# Patient Record
Sex: MALE | Race: WHITE | NOT HISPANIC OR LATINO | URBAN - METROPOLITAN AREA
[De-identification: names, ages, dates, MRNs, and addresses within clinical notes are randomized per-mention and may not be internally consistent; named-entity substitution may affect disease eponyms.]

---

## 2017-10-24 ENCOUNTER — TRANSFERRED RECORDS (OUTPATIENT)
Dept: HEALTH INFORMATION MANAGEMENT | Facility: CLINIC | Age: 47
End: 2017-10-24

## 2017-11-16 ENCOUNTER — OFFICE VISIT (OUTPATIENT)
Dept: OPHTHALMOLOGY | Facility: CLINIC | Age: 47
End: 2017-11-16
Attending: OPHTHALMOLOGY
Payer: COMMERCIAL

## 2017-11-16 ENCOUNTER — TELEPHONE (OUTPATIENT)
Dept: OPHTHALMOLOGY | Facility: CLINIC | Age: 47
End: 2017-11-16

## 2017-11-16 DIAGNOSIS — H35.52 RP (RETINITIS PIGMENTOSA): Primary | ICD-10-CM

## 2017-11-16 PROCEDURE — 92015 DETERMINE REFRACTIVE STATE: CPT | Mod: ZF

## 2017-11-16 PROCEDURE — 92250 FUNDUS PHOTOGRAPHY W/I&R: CPT | Mod: ZF | Performed by: OPHTHALMOLOGY

## 2017-11-16 PROCEDURE — 99214 OFFICE O/P EST MOD 30 MIN: CPT | Mod: ZF

## 2017-11-16 PROCEDURE — 92134 CPTRZ OPH DX IMG PST SGM RTA: CPT | Mod: ZF | Performed by: OPHTHALMOLOGY

## 2017-11-16 RX ORDER — ALLOPURINOL 100 MG/1
100 TABLET ORAL DAILY
COMMUNITY
Start: 2017-06-30 | End: 2019-02-18

## 2017-11-16 ASSESSMENT — CONF VISUAL FIELD
OD_SUPERIOR_NASAL_RESTRICTION: 3
OS_SUPERIOR_TEMPORAL_RESTRICTION: 3
OD_INFERIOR_NASAL_RESTRICTION: 1
OS_SUPERIOR_NASAL_RESTRICTION: 3
OS_INFERIOR_NASAL_RESTRICTION: 1
OD_SUPERIOR_TEMPORAL_RESTRICTION: 3
OD_INFERIOR_TEMPORAL_RESTRICTION: 3
OS_INFERIOR_TEMPORAL_RESTRICTION: 3

## 2017-11-16 ASSESSMENT — REFRACTION_WEARINGRX
SPECS_TYPE: LAST MR
SPECS_TYPE: OTC

## 2017-11-16 ASSESSMENT — EXTERNAL EXAM - RIGHT EYE: OD_EXAM: NORMAL

## 2017-11-16 ASSESSMENT — REFRACTION_MANIFEST
OS_AXIS: 150
OD_SPHERE: -1.25
OS_SPHERE: +0.25
OS_AXIS: 150
OS_SPHERE: +1.75
OS_CYLINDER: +0.25
OD_AXIS: 010
OD_CYLINDER: +1.00
OD_CYLINDER: +1.00
OS_CYLINDER: +0.25
OD_SPHERE: +1.00
OD_AXIS: 010

## 2017-11-16 ASSESSMENT — VISUAL ACUITY
METHOD: SNELLEN - LINEAR
OD_SC+: -1
OD_CC: 20/50
OD_SC: 20/50
OS_CC+: +2
OS_SC: 20/60
OS_SC+: +2
OS_CC: 20/50

## 2017-11-16 ASSESSMENT — TONOMETRY
IOP_METHOD: TONOPEN
OS_IOP_MMHG: 21
OD_IOP_MMHG: 21

## 2017-11-16 ASSESSMENT — CUP TO DISC RATIO
OS_RATIO: 0.15
OD_RATIO: 0.15

## 2017-11-16 ASSESSMENT — EXTERNAL EXAM - LEFT EYE: OS_EXAM: NORMAL

## 2017-11-16 ASSESSMENT — SLIT LAMP EXAM - LIDS
COMMENTS: NORMAL
COMMENTS: NORMAL

## 2017-11-16 NOTE — NURSING NOTE
Chief Complaints and History of Present Illnesses   Patient presents with     New Patient     HPI    Affected eye(s):  Both   Symptoms:     Blurred vision   Decreased vision   Photophobia         Do you have eye pain now?:  No      Comments:  New patient with worsening vision.  The patient has noticed a constriction of his VF this past year.  SILVINO James 8:55 AM 11/16/2017

## 2017-11-16 NOTE — MR AVS SNAPSHOT
After Visit Summary   2017    Alonso Villeda    MRN: [id number]           Patient Information     Date Of Birth          1970        Visit Information        Provider Department      2017 8:45 AM Mitzi Nichols MD [facility]        Today's Diagnoses     RP (retinitis pigmentosa) - Both Eyes    -  1       Follow-ups after your visit        Additional Services     ERG Referral       Era Stone MD to interpret            ERG Referral       Era Stone MD to interpret                  Follow-up notes from your care team     Return in about 7 weeks (around 2018) for review of ffERG .      Who to contact     Please call your clinic at [phone] to:    Ask questions about your health    Make or cancel appointments    Discuss your medicines    Learn about your test results    Speak to your doctor   If you have compliments or concerns about an experience at your clinic, or if you wish to file a complaint, please contact [facility] Patient Relations at [phone] or email us at [email or web address]         Additional Information About Your Visit        MyChart Information     Univa is an electronic gateway that provides easy, online access to your medical records. With Univa, you can request a clinic appointment, read your test results, renew a prescription or communicate with your care team.     To sign up for Univa visit the website at [email or web address]   You will be asked to enter the access code listed below, as well as some personal information. Please follow the directions to create your username and password.     Your access code is: [email or web address]  Expires: 2018  5:30 AM     Your access code will  in 90 days. If you need help or a new code, please contact your [facility] or call [phone] for assistance.        Care EveryWhere ID     This is your Care  EveryWhere ID. This could be used by other organizations to access your [place] medical records  [id number]         Blood Pressure from Last 3 Encounters:   No data found for BP    Weight from Last 3 Encounters:   No data found for Wt              We Performed the Following     ERG Referral     ERG Referral     Fundus Autofluorescence Image (FAF) OU (both eyes)     Fundus Photos OU (both eyes)     OCT Retina Spectralis OU (both eyes)        Primary Care Provider    Physician No Ref-Primary       NO REF-PRIMARY PHYSICIAN        Equal Access to Services     ISREAL JANSEN : Hadii aad ku hadasho Soomaali, waaxda luqadaha, qaybta kaalmada adeegyada, waxay anupin augustinn roseann nevillejenna shukla . So [facility] [phone].    ATENCIÓN: Si imtiaz avery, tiene a reaves disposición servicios gratuitos de asistencia lingüística. Llame al [phone].    We comply with applicable federal civil rights laws and [place] laws. We do not discriminate on the basis of race, color, national origin, age, disability, sex, sexual orientation, or gender identity.            Thank you!     Thank you for choosing EYE CLINIC  for your care. Our goal is always to provide you with excellent care. Hearing back from our patients is one way we can continue to improve our services. Please take a few minutes to complete the written survey that you may receive in the mail after your visit with us. Thank you!             Your Updated Medication List - Protect others around you: Learn how to safely use, store and throw away your medicines at www.disposemymeds.org.          This list is accurate as of: 11/16/17 11:32 AM.  Always use your most recent med list.                   Brand Name Dispense Instructions for use Diagnosis    allopurinol 100 MG tablet    ZYLOPRIM     Take 100 mg by mouth daily

## 2017-11-16 NOTE — PROGRESS NOTES
I have confirmed the patient's and reviewed Past Medical History, Past Surgical History, Social History, Family History, Problem List, Medication List and agree with Tech note.    CC: blurry vision both eyes     HPI: patient referred by Dr. Alejandre, he has Retinitis pigmentosa and visual acuity is worsening.  Interested in ffERG    Assessment/plan:   1.  Retinitis pigmentosa both eyes    - Optos photos and fundus autofluorescence today show severely constricted fields    - OCT both eyes for baseline at this office    - schedule ffERG OU      RTC one month after ffERG    Mitzi Hamlin MD PhD.  Professor & Chair

## 2017-11-20 DIAGNOSIS — H35.52 RP (RETINITIS PIGMENTOSA): Primary | ICD-10-CM

## 2018-01-23 ENCOUNTER — ALLIED HEALTH/NURSE VISIT (OUTPATIENT)
Dept: OPHTHALMOLOGY | Facility: CLINIC | Age: 48
End: 2018-01-23
Attending: OPHTHALMOLOGY
Payer: COMMERCIAL

## 2018-01-23 DIAGNOSIS — H35.52 RP (RETINITIS PIGMENTOSA): ICD-10-CM

## 2018-01-23 PROCEDURE — 92275 FFERG OU (BOTH EYES): CPT | Mod: ZF

## 2018-01-23 PROCEDURE — 40000269 ZZH STATISTIC NO CHARGE FACILITY FEE: Mod: ZF

## 2018-01-23 ASSESSMENT — REFRACTION_WEARINGRX
OS_AXIS: 150
OS_CYLINDER: +0.25
OS_ADD: +2.50
OD_ADD: +2.50
OD_SPHERE: -1.25
OD_CYLINDER: +1.00
OS_SPHERE: +0.25
OD_AXIS: 010

## 2018-01-23 ASSESSMENT — REFRACTION_MANIFEST
OD_ADD: +3.25
OS_ADD: +3.25

## 2018-01-23 NOTE — LETTER
2018    STANDARD ERG REPORT    Referring:  Angelo Hamlin MD      RE:  Alonso Villeda  MRN:  [id number]  :  1970    ERG Date:  2018    CLINICAL HISTORY: Alonso Villeda is a 47-year-old patient with diagnosis of RP (retinitis pigmentosa), referred by Dr. Hamlin for a full-field electroretinogram (ERG). The patient reports he needs more lighting to function visually.  He feels peripherally his vision is decreasing and has been surprised that objects are close to him.    IMPRESSION: Advanced hannah-cone dystrophy            Visual Acuity:     Right Eye:  20/50-            w/MRx 11-16-17 in T.F. & IOL    Visual Acuity:     Left Eye:    20/50            w/MRx 11-16-17 in T.F. & IOL                                                     ALL AVERAGED  Data for Full-Field ERG Right Eye   Left Eye    Dark-Adapted Patient Normal Patient   0.01 ERG (hannah) amplitude( v) 11 61.3-334.7 -----   0.01 ERG (hannah) implicit time(ms) 96.5 75.1-108.0 -----   MMMMM      3.0 ERG (combined) a-wave amplitude( v) -3 -195.0 to -96.5 -2   3.0 ERG (combined) a-wave implicit time(ms) 15.8 15.2-18.2 15   3.0 ERG (combined) b-wave amplitude( v) 8 115.0-446.3 6*   3.0 ERG (combined) b-wave implicit time(ms) 49.9 30.0-50.3 52.4*   MMMMM      3.0 Oscillatory Potentials  Severely reduced Present Severely reduced    Light-Adapted      3.0 Flicker (30-Hz) amplitude( v) 9 68.5-147.5 7   3.0 Flicker (30-Hz) implicit time(ms) 27.4 21.5-26.8 30         3.0 ERG (cone) a-wave amplitude( v) -5 -59.8 to -24.4 -2   3.0 ERG (cone) a-wave implicit time(ms) 10 14.9-18.0 15   3.0 ERG (cone) b-wave amplitude( v) 13 86.1-205.2 8   3.0 ERG (cone) b-wave implicit time(ms) 34.1 25.8-29.6 34.9             * = manipulated cursors    INTERPRETATION:  This full-field electroretinogram was performed according to ISCEV standards using the  ESPION E3 system and DTL fiber-recording electrodes. The patient tolerated the testing well. The waveforms are fairly  reproducible and well formed. The normative values provided above represent the 95% confidence limits for a normal individual the age of the patient. The patient s responses are averaged. There is mild asymmetry of the responses in between both eyes. The hannah-specific responses have problems with reproducibility and muscle artifact, but probably normal for both eyes.    In dark-adapted conditions, the hannah-specific responses have severely reduced amplitudes in the right eye and non-detectable amplitude in the left eye. The implicit time in the right eye is normal. The maximal response, a combined hannah and cone response, has severely reduced amplitudes in both eyes and the implicit time is normal in the right eye and is delayed left eye. With a higher intensity flash the responses improve, but are persistently reduced in both eyes. The bright flash (scotopic 10.0) response is not electronegative. The oscillatory potentials are severely reduced bilaterally.    In light-adapted conditions, the 30-Hz flicker responses have minimal residual amplitudes and the implicit time is delayed in both eyes.  The single photopic response has abnormal amplitudes and the implicit time is delayed for the cone b-wave responses in both eyes.    CONCLUSION: This represents an abnormal electroretinogram consistent with the diagnosis of RP (retinitis pigmentosa). This electroretinogram is abnormal, showing severe loss of hannah/cone function. There is only minimal residual amplitude. Consideration could be given to drawing blood for the retinal dystrophy panel, with hopes of determining the mutations accounting for this retinal dystrophy. Clinical correlation is recommended.    A repeat electroretinogram could be considered in 1-2 years, or earlier if the clinical situation changes.     Angelo, thank you for the opportunity to provide electrophysiologic services for this patient.  Please do not hesitate to call if there should be any questions  regarding these results.    Sincerely,    Era Stone MD     Retina Service   Department of Ophthalmology & Visual Neurosciences   [facility]  Phone: [phone]   Fax: [phone]         cc:  Jonel Alejandre MD/ IBRAHIMAS

## 2018-01-23 NOTE — MR AVS SNAPSHOT
After Visit Summary   2018    Alonso Villeda    MRN: [id number]           Patient Information     Date Of Birth          1970        Visit Information        Provider Department      2018 1:00 PM [facility] EYE ELECTRODIAGNOSTIC Eye Clinic         Follow-ups after your visit        Your next 10 appointments already scheduled     2018 10:00 AM CST   RETURN RETINA with Mitzi Nichols MD   Eye Clinic ([facility])    [address]  [address]  [address]  [place] [facility] [address] [phone]              Who to contact     Please call your clinic at [phone] to:    Ask questions about your health    Make or cancel appointments    Discuss your medicines    Learn about your test results    Speak to your doctor   If you have compliments or concerns about an experience at your clinic, or if you wish to file a complaint, please contact [facility] Patient Relations at [phone] or email us at [email or web address]         Additional Information About Your Visit        MyChart Information     ZenCard is an electronic gateway that provides easy, online access to your medical records. With ZenCard, you can request a clinic appointment, read your test results, renew a prescription or communicate with your care team.     To sign up for ZenCard visit the website at [email or web address]   You will be asked to enter the access code listed below, as well as some personal information. Please follow the directions to create your username and password.     Your access code is: [email or web address]  Expires: 4/15/2018  9:11 AM     Your access code will  in 90 days. If you need help or a new code, please contact your [facility] or call [phone] for assistance.        Care EveryWhere ID     This is your Care EveryWhere ID. This could be used by other organizations to access your  [place] medical records  [id number]         Blood Pressure from Last 3 Encounters:   No data found for BP    Weight from Last 3 Encounters:   No data found for Wt              Today, you had the following     No orders found for display       Primary Care Provider Fax #    Physician No Ref-Primary [phone]       No address on file        Equal Access to Services     ISREAL JANSEN : Hadii aad ku hadasho Soomaali, waaxda luqadaha, qaybta kaalmada adeegyada, waxay anupin hayrubenn adekacey dennis la'rubenyazan . So [facility] [phone].    ATENCIÓN: Si habla español, tiene a reaves disposición servicios gratuitos de asistencia lingüística. [facility] [phone].    We comply with applicable federal civil rights laws and [place] laws. We do not discriminate on the basis of race, color, national origin, age, disability, sex, sexual orientation, or gender identity.            Thank you!     Thank you for choosing EYE CLINIC  for your care. Our goal is always to provide you with excellent care. Hearing back from our patients is one way we can continue to improve our services. Please take a few minutes to complete the written survey that you may receive in the mail after your visit with us. Thank you!             Your Updated Medication List - Protect others around you: Learn how to safely use, store and throw away your medicines at www.disposemymeds.org.          This list is accurate as of: 1/23/18  3:51 PM.  Always use your most recent med list.                   Brand Name Dispense Instructions for use Diagnosis    allopurinol 100 MG tablet    ZYLOPRIM     Take 100 mg by mouth daily

## 2018-01-24 ASSESSMENT — VISUAL ACUITY
OD_SC: 20/50
OS_CC: 20/50
METHOD: SNELLEN - LINEAR
OD_CC+: -
OD_SC+: -
OD_CC: 20/50
OS_SC+: -+
OS_SC: 20/60

## 2018-01-24 NOTE — NURSING NOTE
Returns for ffERG per ERG Referral, EVK 11-16.  Referred by Dr. Jonel Alejandre/WOOD to K for presumed RP.  See Outside Outpt notes 10-24-17.  INDER wallace/EVK 11-16-17 and no interval changes to report.  Has not yet filled gls rx and has questions about the different rxs that were determined at last visit.  Not all rxs printed when he received the rx printout.  Needs more lighting to function visually.  He feels peripheral vision is decreasing and has been surprised that objects are close to him.  Will attempt MyChart again.  Scheduled for RTN to Retina (EVK) 02-07 and will await results.

## 2018-01-24 NOTE — PROGRESS NOTES
2018    STANDARD ERG REPORT    Referring:  MD Jonel Anne MD/ IBRAHIMAS  RE:  Alonso Villeda  MRN:  [id number]  :  1970    ERG Date:  2018    CLINICAL HISTORY: Alonso Villeda is a 47 year old year-old patient with diagnosis of RP (retinitis pigmentosa), referred by Dr. Nichols for a full field electroretinogram (ERG). The patient reports he needs more lighting to function visually.  He feels peripheral his vision is decreasing and has been surprised that objects are close to him    IMPRESSION: Advanced Murphy-cone dystrophy          Visual Acuity Right Eye : 20/50-      w/MRx 11-16-17 in T.F. & IOL    Visual Acuity Left Eye : 20/50      w/MRx 11-16-17 in T.F. & IOL                      ALL AVERAGED  Data for Full-Field ERG Right Eye   Left Eye    Dark-Adapted Patient Normal Patient   0.01 ERG (murphy) amplitude( v) 11 61.3-334.7 -----   0.01 ERG (murphy) implicit time(ms) 96.5 75.1-108.0 -----   MMMMM      3.0 ERG (combined) a-wave amplitude( v) -3 -195.0 to -96.5 -2   3.0 ERG (combined) a-wave implicit time(ms) 15.8 15.2-18.2 15   3.0 ERG (combined) b-wave amplitude( v) 8 115.0-446.3 6*   3.0 ERG (combined) b-wave implicit time(ms) 49.9 30.0-50.3 52.4*   MMMMM      3.0 Oscillatory Potentials   Present     Light-Adapted      3.0 Flicker (30-Hz) amplitude( v) 9 68.5-147.5 7   3.0 Flicker (30-Hz) implicit time(ms) 27.4 21.5-26.8 30         3.0 ERG (cone) a-wave amplitude( v) -5 -59.8 to -24.4 -2   3.0 ERG (cone) a-wave implicit time(ms) 10 14.9-18.0 15   3.0 ERG (cone) b-wave amplitude( v) 13 86.1-205.2 8   3.0 ERG (cone) b-wave implicit time(ms) 34.1 25.8-29.6 34.9         * = manipulated cursors    INTERPRETATION:        This full field electroretinogram was performed according to ISCEV standards using Advasense E3 system and DTL fiber recording electrodes. The patient tolerated the testing well.  The waveforms are fairly reproducible and well formed.  The normative values provided above represent  the 95% confidence limits for a normal individual the age of the patient. The patient s responses are averaged. There is mild asymmetry of the responses in between both eyes.  The hannah-specific responses have problems with reproducibility and muscle artifact, but probably normal for both eyes.  In dark adapted conditions, the hannah-specific responses have severe reduced amplitudes in right eye and non detectable amplitude left eye. The implicit time right eye  is normal. The maximal response, a combined hannah and cone responses, have severe reduced amplitudes in both eyes and the implicit time is normal right eye and delayed left eye.  With a higher intensity flash, the responses improve, but persistently reduced in both eyes. The bright flash (scotopic 10.0) response is not electronegative. The oscillatory potentials are severely reduced bilaterally.    In light adapted conditions, the 30-Hz flicker responses have minimal residual amplitude and the implicit time is delayed in both eyes.    The single photopic response has abnormal amplitudes and and the implicit time is delayed for the cone b-wave responses in both eyes.    Conclusion: This represents an abnormal electroretinogram consistent with the diagnosis of RP (retinitis pigmentosa).   This electroretinogram is abnormal, showing severe loss of hannah/cone function. There is only minimal residual amplitude.  Consideration could be given to drawing blood for the retinal dystrophy panel, with hopes of determining the mutations accounting for this retinal dystrophy.     Clinical correlation is recommended.    A  repeat electroretinogram could be considered in 1-2 years or earlier if the clinical situation changes.     Kayceer Angelo, thank you for the opportunity to provide electrophysiologic services for this patient.  Please do not hesitate to call if there should be any questions regarding these results.    Era Stone MD     Retina Service    Department of Ophthalmology & Visual Neurosciences   [facility]  Phone: [phone]   Fax: [phone]

## 2018-02-07 ENCOUNTER — OFFICE VISIT (OUTPATIENT)
Dept: OPHTHALMOLOGY | Facility: CLINIC | Age: 48
End: 2018-02-07
Attending: OPHTHALMOLOGY
Payer: COMMERCIAL

## 2018-02-07 DIAGNOSIS — H35.52 RP (RETINITIS PIGMENTOSA): Primary | ICD-10-CM

## 2018-02-07 PROCEDURE — G0463 HOSPITAL OUTPT CLINIC VISIT: HCPCS | Mod: ZF

## 2018-02-07 ASSESSMENT — REFRACTION_WEARINGRX: SPECS_TYPE: OTC

## 2018-02-07 ASSESSMENT — TONOMETRY
IOP_METHOD: ICARE
OD_IOP_MMHG: 15
OS_IOP_MMHG: 15

## 2018-02-07 ASSESSMENT — VISUAL ACUITY
OD_CC+: -1
METHOD: SNELLEN - LINEAR
OS_CC: 20/60
CORRECTION_TYPE: GLASSES
OS_CC+: -1
OD_CC: 20/60

## 2018-02-07 ASSESSMENT — CONF VISUAL FIELD
OD_INFERIOR_TEMPORAL_RESTRICTION: 3
OS_SUPERIOR_NASAL_RESTRICTION: 3
OD_SUPERIOR_TEMPORAL_RESTRICTION: 3
OS_SUPERIOR_TEMPORAL_RESTRICTION: 3
OD_SUPERIOR_NASAL_RESTRICTION: 3
OS_INFERIOR_NASAL_RESTRICTION: 1
OS_INFERIOR_TEMPORAL_RESTRICTION: 3
OD_INFERIOR_NASAL_RESTRICTION: 1

## 2018-02-07 NOTE — MR AVS SNAPSHOT
After Visit Summary   2018    Alonso Villeda    MRN: [id number]           Patient Information     Date Of Birth          1970        Visit Information        Provider Department      2018 10:00 AM Mitzi Nichols MD [facility]        Today's Diagnoses     RP (retinitis pigmentosa)    -  1       Follow-ups after your visit        Follow-up notes from your care team     Return in about 1 year (around 2019) for RP .      Who to contact     Please call your clinic at [phone] to:    Ask questions about your health    Make or cancel appointments    Discuss your medicines    Learn about your test results    Speak to your doctor   If you have compliments or concerns about an experience at your clinic, or if you wish to file a complaint, please contact [facility] Patient Relations at [phone] or email us at [email or web address]         Additional Information About Your Visit        MyChart Information     Enterra Solutionst is an electronic gateway that provides easy, online access to your medical records. With UniYu, you can request a clinic appointment, read your test results, renew a prescription or communicate with your care team.     To sign up for Enterra Solutionst visit the website at [email or web address]   You will be asked to enter the access code listed below, as well as some personal information. Please follow the directions to create your username and password.     Your access code is: [email or web address]  Expires: 4/15/2018  9:11 AM     Your access code will  in 90 days. If you need help or a new code, please contact your [facility] or call [phone] for assistance.        Care EveryWhere ID     This is your Care EveryWhere ID. This could be used by other organizations to access your [place] medical records  [id number]         Blood Pressure from Last 3 Encounters:   No data found for BP    Weight  from Last 3 Encounters:   No data found for Wt              Today, you had the following     No orders found for display       Primary Care Provider Fax #    Physician No Ref-Primary [phone]       No address on file        Equal Access to Services     JAMESMARGARITA JUSTYNA : Atiya shiela garcia allie Reich, wacarloda luqadaha, qaybta kaalmada mona, liya rmyazan nita. So [facility] [phone].    ATENCIÓN: Si habla español, tiene a reaves disposición servicios gratuitos de asistencia lingüística. Llame al [phone].    We comply with applicable federal civil rights laws and [place] laws. We do not discriminate on the basis of race, color, national origin, age, disability, sex, sexual orientation, or gender identity.            Thank you!     Thank you for choosing EYE CLINIC  for your care. Our goal is always to provide you with excellent care. Hearing back from our patients is one way we can continue to improve our services. Please take a few minutes to complete the written survey that you may receive in the mail after your visit with us. Thank you!             Your Updated Medication List - Protect others around you: Learn how to safely use, store and throw away your medicines at www.disposemymeds.org.          This list is accurate as of 2/7/18 11:49 AM.  Always use your most recent med list.                   Brand Name Dispense Instructions for use Diagnosis    allopurinol 100 MG tablet    ZYLOPRIM     Take 100 mg by mouth daily

## 2018-02-07 NOTE — LETTER
2018       RE: Alonso Villeda  [address] [facility] [address]     Dear Colleague,    Thank you for referring your patient, Alonso Villeda, to the EYE CLINIC at [facility]. Please see a copy of my visit note below.    I have confirmed the patient's and reviewed Past Medical History, Past Surgical History, Social History, Family History, Problem List, Medication List and agree with Tech note.    Patient her for ERG results which show severe murphy and cone dysfunction consistent with RP.    Return to clinic 1-2 years     Mitzi Nichols MD PhD.  Professor & Chair         STANDARD ERG REPORT     Referring:                   MD Jonel Anne MD/ VRS  RE:                  Alonso Villeda  MRN:              [id number]  :               1970     ERG Date:  2018     CLINICAL HISTORY: Alonso Villeda is a 47 year old year-old patient with diagnosis of RP (retinitis pigmentosa), referred by Dr. Nichols for a full field electroretinogram (ERG). The patient reports he needs more lighting to function visually.  He feels peripheral his vision is decreasing and has been surprised that objects are close to him     IMPRESSION: Advanced Murphy-cone dystrophy                                                                                                                                                                                  Visual Acuity             Right Eye :                 20/50-                                                                                                  w/MRx -17 in T.F. & IOL     Visual Acuity             Left Eye :                    20/50                                                                                                  w/MRx 11-16-17 in T.F. & IOL                                                                                                                                                                                                               ALL AVERAGED  Data for Full-Field ERG Right Eye   Left Eye    Dark-Adapted Patient Normal Patient   0.01 ERG (hannah) amplitude( v) 11 61.3-334.7 -----   0.01 ERG (hannah) implicit time(ms) 96.5 75.1-108.0 -----   MMMMM         3.0 ERG (combined) a-wave amplitude( v) -3 -195.0 to -96.5 -2   3.0 ERG (combined) a-wave implicit time(ms) 15.8 15.2-18.2 15   3.0 ERG (combined) b-wave amplitude( v) 8 115.0-446.3 6*   3.0 ERG (combined) b-wave implicit time(ms) 49.9 30.0-50.3 52.4*   MMMMM         3.0 Oscillatory Potentials    Present      Light-Adapted         3.0 Flicker (30-Hz) amplitude( v) 9 68.5-147.5 7   3.0 Flicker (30-Hz) implicit time(ms) 27.4 21.5-26.8 30             3.0 ERG (cone) a-wave amplitude( v) -5 -59.8 to -24.4 -2   3.0 ERG (cone) a-wave implicit time(ms) 10 14.9-18.0 15   3.0 ERG (cone) b-wave amplitude( v) 13 86.1-205.2 8   3.0 ERG (cone) b-wave implicit time(ms) 34.1 25.8-29.6 34.9         * = manipulated cursors     INTERPRETATION:         This full field electroretinogram was performed according to ISCEV standards using F[employer] E3 system and DTL fiber recording electrodes. The patient tolerated the testing well.  The waveforms are fairly reproducible and well formed.  The normative values provided above represent the 95% confidence limits for a normal individual the age of the patient. The patient s responses are averaged. There is mild asymmetry of the responses in between both eyes.  The hannah-specific responses have problems with reproducibility and muscle artifact, but probably normal for both eyes.  In dark adapted conditions, the hannah-specific responses have severe reduced amplitudes in right eye and non detectable amplitude left eye. The implicit time right eye  is normal. The maximal response, a combined hannah and cone responses, have severe reduced amplitudes in both eyes and the implicit time is normal right  eye and delayed left eye.  With a higher intensity flash, the responses improve, but persistently reduced in both eyes. The bright flash (scotopic 10.0) response is not electronegative. The oscillatory potentials are severely reduced bilaterally.     In light adapted conditions, the 30-Hz flicker responses have minimal residual amplitude and the implicit time is delayed in both eyes.    The single photopic response has abnormal amplitudes and and the implicit time is delayed for the cone b-wave responses in both eyes.     Conclusion: This represents an abnormal electroretinogram consistent with the diagnosis of RP (retinitis pigmentosa).   This electroretinogram is abnormal, showing severe loss of hannah/cone function. There is only minimal residual amplitude.  Consideration could be given to drawing blood for the retinal dystrophy panel, with hopes of determining the mutations accounting for this retinal dystrophy.      Clinical correlation is recommended.     A  repeat electroretinogram could be considered in 1-2 years or earlier if the clinical situation changes.      Dear Angelo, thank you for the opportunity to provide electrophysiologic services for this patient.  Please do not hesitate to call if there should be any questions regarding these results.    Era Stone MD       Again, thank you for allowing me to participate in the care of your patient.      Sincerely,    Mitzi Nichols MD

## 2018-02-07 NOTE — PROGRESS NOTES
I have confirmed the patient's and reviewed Past Medical History, Past Surgical History, Social History, Family History, Problem List, Medication List and agree with Tech note.    Patient her for ERG results which show severe murphy and cone dysfunction consistent with RP.    Return to clinic 1-2 years     Mitzi Nichols MD PhD.  Professor & Chair         STANDARD ERG REPORT     Referring:                   MD Jonel Anne MD/ VRS  RE:                  Alonso Villeda  MRN:              [id number]  :               1970     ERG Date:  2018     CLINICAL HISTORY: Alonso Villeda is a 47 year old year-old patient with diagnosis of RP (retinitis pigmentosa), referred by Dr. Nichols for a full field electroretinogram (ERG). The patient reports he needs more lighting to function visually.  He feels peripheral his vision is decreasing and has been surprised that objects are close to him     IMPRESSION: Advanced Murphy-cone dystrophy                                                                                                                                                                                  Visual Acuity             Right Eye :                 20/50-                                                                                                  w/MRx 11-16-17 in T.F. & IOL     Visual Acuity             Left Eye :                    20/50                                                                                                  w/MRx 11-16-17 in T.F. & IOL                                                                                                                                                                                                              ALL AVERAGED  Data for Full-Field ERG Right Eye   Left Eye    Dark-Adapted Patient Normal Patient   0.01 ERG (murphy) amplitude( v) 11 61.3-334.7 -----   0.01 ERG (murphy)  implicit time(ms) 96.5 75.1-108.0 -----   MMMMM         3.0 ERG (combined) a-wave amplitude( v) -3 -195.0 to -96.5 -2   3.0 ERG (combined) a-wave implicit time(ms) 15.8 15.2-18.2 15   3.0 ERG (combined) b-wave amplitude( v) 8 115.0-446.3 6*   3.0 ERG (combined) b-wave implicit time(ms) 49.9 30.0-50.3 52.4*   MMMMM         3.0 Oscillatory Potentials    Present      Light-Adapted         3.0 Flicker (30-Hz) amplitude( v) 9 68.5-147.5 7   3.0 Flicker (30-Hz) implicit time(ms) 27.4 21.5-26.8 30             3.0 ERG (cone) a-wave amplitude( v) -5 -59.8 to -24.4 -2   3.0 ERG (cone) a-wave implicit time(ms) 10 14.9-18.0 15   3.0 ERG (cone) b-wave amplitude( v) 13 86.1-205.2 8   3.0 ERG (cone) b-wave implicit time(ms) 34.1 25.8-29.6 34.9         * = manipulated cursors     INTERPRETATION:         This full field electroretinogram was performed according to ISCEV standards using ESP[employer] E3 system and DTL fiber recording electrodes. The patient tolerated the testing well.  The waveforms are fairly reproducible and well formed.  The normative values provided above represent the 95% confidence limits for a normal individual the age of the patient. The patient s responses are averaged. There is mild asymmetry of the responses in between both eyes.  The hannah-specific responses have problems with reproducibility and muscle artifact, but probably normal for both eyes.  In dark adapted conditions, the hannah-specific responses have severe reduced amplitudes in right eye and non detectable amplitude left eye. The implicit time right eye  is normal. The maximal response, a combined hannah and cone responses, have severe reduced amplitudes in both eyes and the implicit time is normal right eye and delayed left eye.  With a higher intensity flash, the responses improve, but persistently reduced in both eyes. The bright flash (scotopic 10.0) response is not electronegative. The oscillatory potentials are severely reduced bilaterally.     In light  adapted conditions, the 30-Hz flicker responses have minimal residual amplitude and the implicit time is delayed in both eyes.    The single photopic response has abnormal amplitudes and and the implicit time is delayed for the cone b-wave responses in both eyes.     Conclusion: This represents an abnormal electroretinogram consistent with the diagnosis of RP (retinitis pigmentosa).   This electroretinogram is abnormal, showing severe loss of hannah/cone function. There is only minimal residual amplitude.  Consideration could be given to drawing blood for the retinal dystrophy panel, with hopes of determining the mutations accounting for this retinal dystrophy.      Clinical correlation is recommended.     A  repeat electroretinogram could be considered in 1-2 years or earlier if the clinical situation changes.      Dear Angleo, thank you for the opportunity to provide electrophysiologic services for this patient.  Please do not hesitate to call if there should be any questions regarding these results.    Era Stone MD

## 2018-02-07 NOTE — NURSING NOTE
Chief Complaints and History of Present Illnesses   Patient presents with     Follow Up For     Retinitis pigmentosa both eyes      HPI    Affected eye(s):  Both   Symptoms:        Duration:  3 weeks   Frequency:  Constant       Do you have eye pain now?:  No      Comments:  Pt. States that he is here to get results of ERG testing.   No change in VA BE.  No c/o comfort BE.  Genet KAM 10:40 AM February 7, 2018

## 2019-02-18 ENCOUNTER — OFFICE VISIT (OUTPATIENT)
Dept: OPHTHALMOLOGY | Facility: CLINIC | Age: 49
End: 2019-02-18
Attending: OPHTHALMOLOGY
Payer: COMMERCIAL

## 2019-02-18 DIAGNOSIS — H35.52 RP (RETINITIS PIGMENTOSA): ICD-10-CM

## 2019-02-18 DIAGNOSIS — H35.52 RETINITIS PIGMENTOSA: Primary | ICD-10-CM

## 2019-02-18 PROCEDURE — G0463 HOSPITAL OUTPT CLINIC VISIT: HCPCS | Mod: ZF

## 2019-02-18 PROCEDURE — 92015 DETERMINE REFRACTIVE STATE: CPT | Mod: ZF

## 2019-02-18 ASSESSMENT — SLIT LAMP EXAM - LIDS
COMMENTS: NORMAL
COMMENTS: NORMAL

## 2019-02-18 ASSESSMENT — CONF VISUAL FIELD
OS_SUPERIOR_NASAL_RESTRICTION: 3
OD_SUPERIOR_NASAL_RESTRICTION: 3
OS_INFERIOR_TEMPORAL_RESTRICTION: 3
OD_INFERIOR_NASAL_RESTRICTION: 1
OS_NORMAL: 1
OS_SUPERIOR_TEMPORAL_RESTRICTION: 3
OS_INFERIOR_NASAL_RESTRICTION: 1
OD_SUPERIOR_TEMPORAL_RESTRICTION: 3
OD_INFERIOR_TEMPORAL_RESTRICTION: 3
METHOD: COUNTING FINGERS

## 2019-02-18 ASSESSMENT — TONOMETRY
IOP_METHOD: TONOPEN
OD_IOP_MMHG: 18
OS_IOP_MMHG: 18

## 2019-02-18 ASSESSMENT — REFRACTION_MANIFEST
OD_ADD: +2.50
OD_SPHERE: -0.50
OD_AXIS: 005
OS_SPHERE: +1.00
OS_ADD: +2.50
OS_CYLINDER: +0.50
OD_CYLINDER: +0.50
OS_AXIS: 130

## 2019-02-18 ASSESSMENT — CUP TO DISC RATIO
OD_RATIO: 0.15
OS_RATIO: 0.15

## 2019-02-18 ASSESSMENT — VISUAL ACUITY
OS_SC: 20/60
OD_SC+: -1
OS_SC+: -2
METHOD: SNELLEN - LINEAR
OD_SC: 20/50

## 2019-02-18 ASSESSMENT — EXTERNAL EXAM - RIGHT EYE: OD_EXAM: NORMAL

## 2019-02-18 ASSESSMENT — EXTERNAL EXAM - LEFT EYE: OS_EXAM: NORMAL

## 2019-02-18 NOTE — PROGRESS NOTES
I have confirmed the patient's and reviewed Past Medical History, Past Surgical History, Social History, Family History, Problem List, Medication List and agree with Tech note.    CC: blurry vision both eyes     HPI: patient referred by Dr. Alejandre, he has Retinitis pigmentosa and visual acuity is worsening.  Interested in ffERG    Interval history: Patient notes stable visual acuity.     Assessment/plan:   1.  Retinitis pigmentosa both eyes    - Optos photos and fundus autofluorescence show severely constricted fields    - OCT both eyes for baseline at this office    - ERG showed severe loss of hannah/cone function. There is only minimal residual amplitude.   - Discussed low vision options, patient will think about it, placed referral today   - Consider genetic testing/ referral to genetics, patient does have kids   - If patient is interested in [place] license, recommend 's field    - for visual field testing if needed, low vision optometry for glasses, consider genetic testing via retina tracker.      Return to clinic one year for dilated fundus exam and optos fundus autofluorescence     Brittany Rodríguez MD  PGY-3 Ophthalmology    ATTESTATION:  I have seen and examined the patient with   and agree with the findings in this note, as well as the interpretations of the diagnostic tests.    Mitzi Hamlin MD PhD.  Professor & Chair

## 2019-02-18 NOTE — NURSING NOTE
Chief Complaint(s) and History of Present Illness(es)     Follow Up     In both eyes.  Associated symptoms include Negative for dryness, eye pain, redness and tearing.              Comments     Pt doing a yearly f/u for RP (retinitis pigmentosa), BE. Pt notes some changes in vision, but for the most part stable. Pt would also like to get new glasses. Pt denies any use of eye drops.     Dulce Sullivan, COMT 1:53 PM February 18, 2019

## 2019-03-05 ENCOUNTER — OFFICE VISIT (OUTPATIENT)
Dept: OPTOMETRY | Facility: CLINIC | Age: 49
End: 2019-03-05
Payer: COMMERCIAL

## 2019-03-05 DIAGNOSIS — H52.4 PRESBYOPIA: ICD-10-CM

## 2019-03-05 DIAGNOSIS — H35.52 RP (RETINITIS PIGMENTOSA): Primary | ICD-10-CM

## 2019-03-05 ASSESSMENT — REFRACTION_MANIFEST
OS_ADD: +2.25
OD_SPHERE: -0.50
OS_SPHERE: +0.50
OS_CYLINDER: +0.25
OS_AXIS: 160
OD_AXIS: 005
OD_CYLINDER: +0.50
OD_ADD: +2.25

## 2019-03-05 ASSESSMENT — VISUAL ACUITY
METHOD: SNELLEN - LINEAR
OD_CC+: -1
OD_CC: 20/50
OS_CC+: -1
OS_CC: 20/60

## 2019-03-12 ASSESSMENT — TONOMETRY
OD_IOP_MMHG: 15
OS_IOP_MMHG: 15
IOP_METHOD: TONOPEN

## 2019-03-12 ASSESSMENT — CONF VISUAL FIELD
OS_SUPERIOR_NASAL_RESTRICTION: 3
OD_SUPERIOR_NASAL_RESTRICTION: 3
OS_INFERIOR_TEMPORAL_RESTRICTION: 3
OD_SUPERIOR_TEMPORAL_RESTRICTION: 3
OD_INFERIOR_TEMPORAL_RESTRICTION: 3
OD_INFERIOR_NASAL_RESTRICTION: 3
OS_SUPERIOR_TEMPORAL_RESTRICTION: 3
OS_INFERIOR_NASAL_RESTRICTION: 3

## 2019-03-12 ASSESSMENT — CUP TO DISC RATIO
OD_RATIO: 0.15
OS_RATIO: 0.15

## 2019-03-12 ASSESSMENT — EXTERNAL EXAM - LEFT EYE: OS_EXAM: NORMAL

## 2019-03-12 ASSESSMENT — EXTERNAL EXAM - RIGHT EYE: OD_EXAM: NORMAL

## 2019-03-12 ASSESSMENT — SLIT LAMP EXAM - LIDS
COMMENTS: NORMAL
COMMENTS: NORMAL

## 2019-03-12 NOTE — PROGRESS NOTES
Assessment/Plan  (H35.52) RP (retinitis pigmentosa) - Both Eyes  (primary encounter diagnosis)  Comment: Patient currently managed by [facility] retina service  Plan: Discussed findings with patient. Encouraged regular exams by patient's primary eye care provider in [place]. Advised patient that given the progressive nature of his condition, he may not always be able to safely and legally drive. Patient maintains a [place] license and appears to still meet that states' minimum requirements. Patient did note some subjective improvement with new distance glasses, but suspect that his new computer/reading glasses will make a more substantial difference for him. RTC with any new vision changes or prolonged adaptation difficulty to new lenses.     (H52.4) Presbyopia  Plan: REFRACTION [60012]        SRx updated and released.       Complete documentation of historical and exam elements from today's encounter can  be found in the full encounter summary report (not reduplicated in this progress  note). I personally obtained the chief complaint(s) and history of present illness. I  confirmed and edited as necessary the review of systems, past medical/surgical  history, family history, social history, and examination findings as documented by  others; and I examined the patient myself. I personally reviewed the relevant tests,  images, and reports as documented above. I formulated and edited as necessary the  assessment and plan and discussed the findings and management plan with the  patient and family.    John Monson OD

## 2022-06-08 DIAGNOSIS — H35.52 RP (RETINITIS PIGMENTOSA): Primary | ICD-10-CM

## 2022-06-13 ENCOUNTER — OFFICE VISIT (OUTPATIENT)
Dept: OPHTHALMOLOGY | Facility: CLINIC | Age: 52
End: 2022-06-13
Attending: OPHTHALMOLOGY
Payer: COMMERCIAL

## 2022-06-13 DIAGNOSIS — H35.52 RP (RETINITIS PIGMENTOSA): ICD-10-CM

## 2022-06-13 DIAGNOSIS — H35.52 RP (RETINITIS PIGMENTOSA): Primary | ICD-10-CM

## 2022-06-13 PROCEDURE — 92081 LIMITED VISUAL FIELD XM: CPT | Performed by: OPHTHALMOLOGY

## 2022-06-13 PROCEDURE — 92081 LIMITED VISUAL FIELD XM: CPT | Mod: 26 | Performed by: STUDENT IN AN ORGANIZED HEALTH CARE EDUCATION/TRAINING PROGRAM

## 2022-06-13 PROCEDURE — G0463 HOSPITAL OUTPT CLINIC VISIT: HCPCS

## 2022-06-13 PROCEDURE — 99204 OFFICE O/P NEW MOD 45 MIN: CPT | Mod: GC | Performed by: STUDENT IN AN ORGANIZED HEALTH CARE EDUCATION/TRAINING PROGRAM

## 2022-06-13 PROCEDURE — 92250 FUNDUS PHOTOGRAPHY W/I&R: CPT | Mod: 26 | Performed by: STUDENT IN AN ORGANIZED HEALTH CARE EDUCATION/TRAINING PROGRAM

## 2022-06-13 PROCEDURE — 92250 FUNDUS PHOTOGRAPHY W/I&R: CPT | Performed by: OPHTHALMOLOGY

## 2022-06-13 ASSESSMENT — CONF VISUAL FIELD
OD_INFERIOR_TEMPORAL_RESTRICTION: 3
OS_SUPERIOR_TEMPORAL_RESTRICTION: 3
OD_SUPERIOR_NASAL_RESTRICTION: 3
OD_SUPERIOR_TEMPORAL_RESTRICTION: 3
METHOD: COUNTING FINGERS
OS_SUPERIOR_NASAL_RESTRICTION: 3
OS_INFERIOR_TEMPORAL_RESTRICTION: 3
OD_INFERIOR_NASAL_RESTRICTION: 3
OS_INFERIOR_NASAL_RESTRICTION: 3

## 2022-06-13 ASSESSMENT — VISUAL ACUITY
OD_SC+: -1
METHOD_MR_RETINOSCOPY: 1
OD_SC: 20/60
METHOD: SNELLEN - LINEAR
OS_SC: 20/60
OS_SC+: -1

## 2022-06-13 ASSESSMENT — REFRACTION_MANIFEST
OD_AXIS: 015
OD_CYLINDER: +1.00
OS_AXIS: 160
OS_ADD: +1.75
OS_CYLINDER: +0.25
OD_SPHERE: -0.50
OD_ADD: +1.75
OS_SPHERE: +0.50

## 2022-06-13 ASSESSMENT — SLIT LAMP EXAM - LIDS
COMMENTS: NORMAL
COMMENTS: NORMAL

## 2022-06-13 ASSESSMENT — TONOMETRY
OS_IOP_MMHG: 14
IOP_METHOD: ICARE
OD_IOP_MMHG: 16

## 2022-06-13 ASSESSMENT — CUP TO DISC RATIO
OD_RATIO: 0.4
OS_RATIO: 0.2

## 2022-06-13 ASSESSMENT — EXTERNAL EXAM - RIGHT EYE: OD_EXAM: NORMAL

## 2022-06-13 ASSESSMENT — EXTERNAL EXAM - LEFT EYE: OS_EXAM: NORMAL

## 2022-06-13 NOTE — PROGRESS NOTES
I have confirmed the patient's and reviewed Past Medical History, Past Surgical History, Social History, Family History, Problem List, Medication List and agree with Tech note.    CC: blurry vision both eyes     HPI: Last exam in retina clinic 2/2019 for retinitis pigmentosa. Patient denies vision changes. Here for follow up and for driving test evaluation.     Interval history: Patient notes stable visual acuity.     Assessment/plan:   1.  Retinitis pigmentosa both eyes    - Optos photos and fundus autofluorescence show severely constricted fields appears mostly stable today compared to 2019   - OCT 2019 with severe outer retinal loss both eyes     - ERG showed severe loss of hannah/cone function. There is only minimal residual amplitude.   - Working from home and remote, has not visited with low vision is not interested at this time    - Consider genetic testing/ referral to genetics, patient does have kids - patient not interested at this time   - DMV fields constricted 6/13/22:  right eye ~10 degrees in the horizontal, left eye 20 degrees.       Return to clinic one year for dilated fundus exam and optos fundus autofluorescence     Santa Carr MD  Ophthalmology Resident, PGY-3  [facility]       Attestation  I have seen and examined the patient with Dr. Santa Carr MD and agree with the findings in this note, as well as the interpretations of the diagnostic tests.        Mitzi Hamlin MD PhD.  Professor & Chair

## 2022-06-13 NOTE — NURSING NOTE
Chief Complaints and History of Present Illnesses   Patient presents with     Retinitis/choroiditis Follow Up     RP (retinitis pigmentosa) - Both Eyes      Chief Complaint(s) and History of Present Illness(es)     Retinitis/choroiditis Follow Up     Comments: RP (retinitis pigmentosa) - Both Eyes               Comments     Pt states he is here for an exam to renew his DL   Pt states he would also like a refraction for new glasses as he has lost .  States no flashes, floaters eye pain or redness.     Luisa Carranza COT 3:09 PM June 13, 2022

## 2024-11-25 NOTE — LETTER
11/16/2017     RE: Alonso Villeda  [address]  [facility] [address]     Dear Colleague,    Thank you for referring your patient, Alonso Villeda, to the EYE CLINIC at [facility]. Please see a copy of my visit note below.    I have confirmed the patient's and reviewed Past Medical History, Past Surgical History, Social History, Family History, Problem List, Medication List and agree with Tech note.    CC: blurry vision both eyes     HPI: patient referred by Dr. Alejandre, he has Retinitis pigmentosa and visual acuity is worsening.  Interested in ffERG    Assessment/plan:   1.  Retinitis pigmentosa both eyes    - Optos photos and fundus autofluorescence today show severely constricted fields    - OCT both eyes for baseline at this office    - schedule ffERG OU    RTC one month after ffERG    Mitzi Hamlin MD PhD.  Professor & Chair    
yes